# Patient Record
(demographics unavailable — no encounter records)

---

## 2024-12-18 NOTE — HISTORY OF PRESENT ILLNESS
[FreeTextEntry1] : 58 y/o female with hx of cleft lip/palate s/p revision, trigeminal neuralgia,HTN, HLD, Hypothyroidism, chronic hyponatremia with reported baseline serum Na of 126. Patient was recently hospitalized on 03/21-03/24 ; brought in from home by her Daughter due to abdominal pain and poor PO intake for past 3 days. Pt endorsed chronic abdominal pain and was scheduled for EGD before presenting to the hospital. In the ED, pt with stable vitals.  CT abd/pelviswithout acute pathology. Lipase normal, LFTs normal. U/A neg, labs noted to have serum Na of 124, was given 1 liter NS with SNA+ worsening to 120. in ED, started on 2% NS with repeat Na returning at 126 and she was discharged home. She had an EGD and pain was attributed to GERD, she was discharged on Omperazole 40 mg daily.  Pt now presents for evaluation of hyponatremia. Pt is accompanied by daughter Barbie who is translating. Pt seen and examined; continues to epigastric and RLQ pain. Scheduled for colonoscopy on may4th. She drinks about 64 oz of water; also drinks 1 bottle of pedialyte Appetite is 'not so good' Reports feeling tired, feels unsteady sometimes ------------------------------------------------------------------------------------  05/18/2023 Pt presents for f/u of hyponatremia Pt is accompanied by daughter, Barbie who is translating for pt. pt seen and examined; feels well She was hospitalized for worsening hyponatremia on 04/18 for epigastric pain and worsening hyponatremia. She was given multiple rounds of NS, 1.5% NS and 2% NS. SNA+ was 137 on hospital discharge. Pt  seen and examined; had EGD and colonoscopy- normal studies She is on Sucralfate and Pantoprazole Also on salt tabs 1 g bid; threw up 2-3 times after taking salt tabs but tolerating ok at this time Appetite is ok, better than before. has occasional nausea Drinks 32 oz of water and 1 -2 x 12 oz coffee Reports fatigued, wants to sleep most of the times --------------------  12/18/2024 Today. patient presents today for follow-up of hyponatremia. Patient is accompanied by daughter who is translating for patient. Patient seen and examined, feels well. Daughter reports patient just returned from [facility] after feeling rectal 1-1/2 years.  While she was there, she had intermittent episodes of hyponatremia with sodium dropping as low as 118 for which she was getting given 'electrolyte drinks'.  Sodium more recently about 3 months ago was 131 as per patient.  Patient reports good appetite. She drinks 2 x 16 ounce water bottles and 3 x 8 ounce of coffee every day.  she has not been taking salt tabs and also stopped lisinopril 'because her bp was fine'

## 2024-12-18 NOTE — PHYSICAL EXAM
[General Appearance - Alert] : alert [Strabismus] : no strabismus was seen [Hearing Threshold Finger Rub Not [place]] : hearing was normal [Auscultation Breath Sounds / Voice Sounds] : lungs were clear to auscultation bilaterally [Heart Sounds] : normal S1 and S2 [Abdomen Soft] : soft [No CVA Tenderness] : no ~M costovertebral angle tenderness [Abnormal Walk] : normal gait [] : no rash [No Focal Deficits] : no focal deficits [Oriented To Time, Place, And Person] : oriented to person, place, and time [Impaired Insight] : insight and judgment were intact [Affect] : the affect was normal [Mood] : the mood was normal

## 2024-12-18 NOTE — ASSESSMENT
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